# Patient Record
Sex: MALE | Race: WHITE
[De-identification: names, ages, dates, MRNs, and addresses within clinical notes are randomized per-mention and may not be internally consistent; named-entity substitution may affect disease eponyms.]

---

## 2022-11-08 PROBLEM — Z00.00 ENCOUNTER FOR PREVENTIVE HEALTH EXAMINATION: Status: ACTIVE | Noted: 2022-11-08

## 2022-11-29 ENCOUNTER — APPOINTMENT (OUTPATIENT)
Dept: PLASTIC SURGERY | Facility: CLINIC | Age: 32
End: 2022-11-29

## 2022-11-29 VITALS — BODY MASS INDEX: 35.89 KG/M2 | HEIGHT: 72 IN | WEIGHT: 265 LBS

## 2022-11-29 PROCEDURE — 99203 OFFICE O/P NEW LOW 30 MIN: CPT

## 2022-11-29 NOTE — HISTORY OF PRESENT ILLNESS
[FreeTextEntry1] : 32 yr old male\par \par denies all medical issues\par \par anxiety (lost brother in March)\par \par here for multiple cysts

## 2022-11-29 NOTE — ASSESSMENT
[FreeTextEntry1] : pt wishes to address scalp cyst only at this time\par \par Regarding the procedure, we discussed scarring, poor wound healing, bleeding, infection, need for additional surgery, and dissatisfaction with the outcome.  Also discussed possibility of keloid and/or hypertrophic scar formation as well as recurrence.  All questions were answered and risks understood.\par \par Due to COVID 19, pre-visit patient instructions were explained to the patient and their symptoms were checked upon arrival.  \par Masks were used by the health care providers and staff and the examination room was cleaned after the patient visit was completed.\par

## 2022-11-29 NOTE — PHYSICAL EXAM
[NI] : Normal [de-identified] : left posterior scalp cyst approx 1.5cm [de-identified] : left mid back subq cyst approx 1.2cm

## 2023-03-10 ENCOUNTER — APPOINTMENT (OUTPATIENT)
Dept: PLASTIC SURGERY | Facility: CLINIC | Age: 33
End: 2023-03-10
Payer: MEDICAID

## 2023-03-10 DIAGNOSIS — D48.5 NEOPLASM OF UNCERTAIN BEHAVIOR OF SKIN: ICD-10-CM

## 2023-03-10 PROCEDURE — 13120 CMPLX RPR S/A/L 1.1-2.5 CM: CPT | Mod: 59

## 2023-03-10 PROCEDURE — 11422 EXC H-F-NK-SP B9+MARG 1.1-2: CPT

## 2023-03-21 NOTE — PROCEDURE
[FreeTextEntry6] : Patient is a 32 year old male with a left posterior scalp cyst measuring approximately 1.5 x 1 cm.  \par \par The area was prepped and draped in the usual fashion.  Local anesthetic was administered using 1% lidocaine with epinephrine.\par \par The cyst was sharply excised.  Area was irrigated copiously.  Complex wound closure was performed in layers.  The wound measured approximately 2.2 cm.\par \par Sterile dressing applied.  \par \par Patient tolerated procedure well and understands post-op instructions.\par \par Sutures Used: 2-0 prolene\par \par Due to COVID 19, pre-visit patient instructions were explained to the patient and their symptoms were checked upon arrival.  \par Masks were used by the health care providers and staff and the examination room was cleaned after the patient visit was completed.\par

## 2023-03-23 ENCOUNTER — APPOINTMENT (OUTPATIENT)
Dept: PLASTIC SURGERY | Facility: CLINIC | Age: 33
End: 2023-03-23
Payer: MEDICAID

## 2023-03-23 DIAGNOSIS — L72.9 FOLLICULAR CYST OF THE SKIN AND SUBCUTANEOUS TISSUE, UNSPECIFIED: ICD-10-CM

## 2023-03-23 LAB — CORE LAB BIOPSY: NORMAL

## 2023-03-23 PROCEDURE — 99212 OFFICE O/P EST SF 10 MIN: CPT

## 2023-03-23 NOTE — ASSESSMENT
[FreeTextEntry1] : 33 yo M with several scalp cyst now POD#13 s/p excision of left scalp cyst. Doing well. \par \par - sutures removed\par - daily Aquaphor\par - may shower\par - pathology discussed - benign cyst \par - post-op instructions reviewed and all his questions were answered\par - f/u PRN for removal of left posterior scalp cyst

## 2023-03-23 NOTE — PHYSICAL EXAM
[NI] : Normal [de-identified] : NAD [de-identified] : left scalp incision healing well, c/d/i , no erythema,  minimal swelling, left posterior scalp with small subcutaneous cyst, noninflamed  [de-identified] : posterior neck skin tag  [de-identified] : left mid back subq cyst approx 1.2cm

## 2023-03-23 NOTE — HISTORY OF PRESENT ILLNESS
[FreeTextEntry1] : 32 yr old male here for multiple cysts\par \par denies all medical issues\par \par anxiety (lost brother in March)\par \par Interval hx (3/23/23) Pt here POD#13 s/p excision of left scalp cyst. Doing well with no significant pain, f/c or drainage.

## 2023-03-23 NOTE — DATA REVIEWED
[FreeTextEntry1] : Tissue Biopsy             Final\par \par No Documents Attached\par \par \par   Patient:   ANCELMO MITCHELL\par \par \par Accession:                             [id number]\par \par Collected Date/Time:                   3/10/2023 12:52 EST\par Received Date/Time:                    3/13/2023 08:27 EDT\par \par Surgical Pathology Report - Auth (Verified)\par \par Specimen(s) Submitted\par Left posterior scalp cyst\par \par Final Diagnosis\par Left posterior scalp cyst:\par - Trichilemmal cyst (benign).\par \par Verified by: Faith Fine M.D.\par (Electronic Signature)\par Reported on: 03/17/23 01:52 EDT, [facility],\par  [address] [place] [address]\par Phone: [phone]   Fax: [phone]\par _________________________________________________________________\par \par Clinical Information\par Left posterior scalp cyst\par \par Tissue biopsy\par \par Perioperative Diagnosis\par ICD10:  D48.5\par \par Gross Description\par The specimen is received in formalin labeled "left posterior scalp cyst"\par  and consists of a tan-white nodule, measuring 1.6 x 1.1 x 1 cm, with\par  attached tan-white skin, measuring 1.3 x 0.2 cm.  The surface of the\par  nodule is inked black.  On sectioning the cut surfaces are tan-white with\par  a central tan-yellow gritty area, measuring 0.4 x 0.3 cm.  The specimen\par  is entirely submitted.  (1 block)\par \par Specimen was received and underwent gross examination at [place] [id number].\par \par 03/13/2023 11:14:13 EDT  rc\par \par  \par \par  Ordered by: CHRISTOPHER BA IV/Examined: 10Mar2023 12:52PM       \par Verification Required       Stage: Final       \par  Performed at: [facility] (Med Director: Ryland Poe)       Resulted: 17Mar2023 01:52AM       Last Updated: 17Mar2023 01:52AM       Accession: [id number]

## 2023-11-03 ENCOUNTER — APPOINTMENT (OUTPATIENT)
Dept: PLASTIC SURGERY | Facility: CLINIC | Age: 33
End: 2023-11-03

## 2025-03-21 ENCOUNTER — NON-APPOINTMENT (OUTPATIENT)
Age: 35
End: 2025-03-21